# Patient Record
Sex: MALE | Race: BLACK OR AFRICAN AMERICAN | NOT HISPANIC OR LATINO
[De-identification: names, ages, dates, MRNs, and addresses within clinical notes are randomized per-mention and may not be internally consistent; named-entity substitution may affect disease eponyms.]

---

## 2019-01-30 PROBLEM — Z00.00 ENCOUNTER FOR PREVENTIVE HEALTH EXAMINATION: Status: ACTIVE | Noted: 2019-01-30

## 2019-02-14 ENCOUNTER — APPOINTMENT (OUTPATIENT)
Dept: POPULATION HEALTH | Facility: CLINIC | Age: 64
End: 2019-02-14
Payer: COMMERCIAL

## 2019-02-14 VITALS
DIASTOLIC BLOOD PRESSURE: 106 MMHG | TEMPERATURE: 98.5 F | RESPIRATION RATE: 18 BRPM | WEIGHT: 294 LBS | HEART RATE: 109 BPM | HEIGHT: 69 IN | SYSTOLIC BLOOD PRESSURE: 166 MMHG | BODY MASS INDEX: 43.55 KG/M2 | OXYGEN SATURATION: 97 %

## 2019-02-14 DIAGNOSIS — Z87.39 PERSONAL HISTORY OF OTHER DISEASES OF THE MUSCULOSKELETAL SYSTEM AND CONNECTIVE TISSUE: ICD-10-CM

## 2019-02-14 DIAGNOSIS — Z86.79 PERSONAL HISTORY OF OTHER DISEASES OF THE CIRCULATORY SYSTEM: ICD-10-CM

## 2019-02-14 DIAGNOSIS — Z28.21 IMMUNIZATION NOT CARRIED OUT BECAUSE OF PATIENT REFUSAL: ICD-10-CM

## 2019-02-14 DIAGNOSIS — Z80.1 FAMILY HISTORY OF MALIGNANT NEOPLASM OF TRACHEA, BRONCHUS AND LUNG: ICD-10-CM

## 2019-02-14 DIAGNOSIS — E11.9 TYPE 2 DIABETES MELLITUS W/OUT COMPLICATIONS: ICD-10-CM

## 2019-02-14 DIAGNOSIS — Z80.42 FAMILY HISTORY OF MALIGNANT NEOPLASM OF PROSTATE: ICD-10-CM

## 2019-02-14 PROCEDURE — 99205 OFFICE O/P NEW HI 60 MIN: CPT

## 2019-02-14 RX ORDER — RIVAROXABAN 2.5 MG/1
TABLET, FILM COATED ORAL
Refills: 0 | Status: ACTIVE | COMMUNITY

## 2019-02-14 RX ORDER — CHLORTHALIDONE 25 MG/1
25 TABLET ORAL
Refills: 0 | Status: ACTIVE | COMMUNITY

## 2019-02-14 RX ORDER — ALLOPURINOL 300 MG/1
300 TABLET ORAL
Qty: 90 | Refills: 0 | Status: ACTIVE | COMMUNITY
Start: 2018-11-19

## 2019-02-14 RX ORDER — DULAGLUTIDE 1.5 MG/.5ML
1.5 INJECTION, SOLUTION SUBCUTANEOUS
Qty: 6 | Refills: 0 | Status: ACTIVE | COMMUNITY
Start: 2018-06-27

## 2019-02-14 RX ORDER — AMLODIPINE BESYLATE 10 MG/1
10 TABLET ORAL
Refills: 0 | Status: ACTIVE | COMMUNITY

## 2019-02-14 RX ORDER — FINASTERIDE 5 MG/1
5 TABLET, FILM COATED ORAL
Refills: 0 | Status: ACTIVE | COMMUNITY

## 2019-02-14 RX ORDER — SITAGLIPTIN 100 MG/1
100 TABLET, FILM COATED ORAL
Refills: 0 | Status: ACTIVE | COMMUNITY

## 2019-02-14 RX ORDER — LISINOPRIL 40 MG/1
40 TABLET ORAL
Refills: 0 | Status: ACTIVE | COMMUNITY

## 2019-05-16 ENCOUNTER — APPOINTMENT (OUTPATIENT)
Dept: POPULATION HEALTH | Facility: CLINIC | Age: 64
End: 2019-05-16
Payer: COMMERCIAL

## 2019-05-16 VITALS
RESPIRATION RATE: 18 BRPM | DIASTOLIC BLOOD PRESSURE: 90 MMHG | OXYGEN SATURATION: 98 % | BODY MASS INDEX: 43.99 KG/M2 | WEIGHT: 297 LBS | SYSTOLIC BLOOD PRESSURE: 160 MMHG | HEIGHT: 69 IN | TEMPERATURE: 98.2 F | HEART RATE: 111 BPM

## 2019-05-16 PROCEDURE — 99214 OFFICE O/P EST MOD 30 MIN: CPT

## 2019-05-16 NOTE — HEALTH RISK ASSESSMENT
[Patient declined colonoscopy] : Patient declined colonoscopy [ColonoscopyComments] : Says will schedule via PMD

## 2019-05-16 NOTE — PAST MEDICAL HISTORY
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 \par Around the time of the event, would start driving 2:45 pm until ~ 1am. One thirty minute break that could fall anywhere during the shift.

## 2019-05-16 NOTE — HISTORY OF PRESENT ILLNESS
[FreeTextEntry1] : 64 yo DM2, obesity, never smoker, HTN rtd  here for routine f/u of work-inducted  8/17. Aug 2, 2017 \par In the interim since our last visit no issues of recurrence, calf pain, SOB/INGRAM, CP or bleeding d/o. Is adherent with anti-coag meds. Noting MSK issues in his elbow and knees that he believes are work-related (but no case established)\par \par BP is high today. Says he has not been taking his BP meds consistently. Denies CP, SOB, INGRAM, Dizziness, HA, blurry vision. \par

## 2019-05-16 NOTE — ASSESSMENT
[FreeTextEntry1] : 63 m h/o DM, HTN, obesity here for routine f/u of  work-related DVT/PE. Issue is stable. RF and s/sxs of DVT/PE reviewed. Pt inquired about possible work-related knee pain and elbow pain but discussion/evaluation deferred as these are not related to the work-related DVT. Pt should continue with Xarelto and f/u with his treating MDs, as warranted. No further intervention at this time.  \par \par Regarding the incidental finding of elevated BP, he is asymptomatic and not so elevated as to warrant him going to the ED however we discussed s/sxs of CAD and CVA and advised him to call 911 if he experiences any of these. I also advised him to go to see his PMD ASAP (or to call 911 if symptomatic)\par Extensive discussion about the importance and value of wt loss, diet, exercise. All questions answered. Pt agrees and understands plan. \par \par Will fill out C4

## 2019-05-16 NOTE — PHYSICAL EXAM
[General Appearance - In No Acute Distress] : in no acute distress [General Appearance - Alert] : alert [Extraocular Movements] : extraocular movements were intact [Sclera] : the sclera and conjunctiva were normal [PERRL With Normal Accommodation] : pupils were equal in size, round, and reactive to light [Oropharynx] : the oropharynx was normal [Neck Appearance] : the appearance of the neck was normal [Outer Ear] : the ears and nose were normal in appearance [Thyroid Diffuse Enlargement] : the thyroid was not enlarged [Jugular Venous Distention Increased] : there was no jugular-venous distention [Neck Cervical Mass (___cm)] : no neck mass was observed [Thyroid Nodule] : there were no palpable thyroid nodules [Auscultation Breath Sounds / Voice Sounds] : lungs were clear to auscultation bilaterally [Heart Rate And Rhythm] : heart rate was normal and rhythm regular [Heart Sounds] : normal S1 and S2 [Heart Sounds Gallop] : no gallops [Murmurs] : no murmurs [Full Pulse] : the pedal pulses are present [Heart Sounds Pericardial Friction Rub] : no pericardial rub [Edema] : there was no peripheral edema [Abdomen Soft] : soft [Bowel Sounds] : normal bowel sounds [Cervical Lymph Nodes Enlarged Anterior Bilaterally] : anterior cervical [Cervical Lymph Nodes Enlarged Posterior Bilaterally] : posterior cervical [Supraclavicular Lymph Nodes Enlarged Bilaterally] : supraclavicular [Inguinal Lymph Nodes Enlarged Bilaterally] : inguinal [Femoral Lymph Nodes Enlarged Bilaterally] : femoral [Axillary Lymph Nodes Enlarged Bilaterally] : axillary [] : no rash [Skin Turgor] : normal skin turgor [Skin Color & Pigmentation] : normal skin color and pigmentation [Deep Tendon Reflexes (DTR)] : deep tendon reflexes were 2+ and symmetric [Sensation] : the sensory exam was normal to light touch and pinprick [No Focal Deficits] : no focal deficits [Oriented To Time, Place, And Person] : oriented to person, place, and time [Impaired Insight] : insight and judgment were intact [Affect] : the affect was normal [FreeTextEntry1] : large,

## 2019-08-08 ENCOUNTER — APPOINTMENT (OUTPATIENT)
Dept: POPULATION HEALTH | Facility: CLINIC | Age: 64
End: 2019-08-08
Payer: OTHER MISCELLANEOUS

## 2019-08-08 VITALS
RESPIRATION RATE: 17 BRPM | HEART RATE: 90 BPM | HEIGHT: 68 IN | DIASTOLIC BLOOD PRESSURE: 87 MMHG | WEIGHT: 297 LBS | BODY MASS INDEX: 45.01 KG/M2 | SYSTOLIC BLOOD PRESSURE: 148 MMHG | TEMPERATURE: 97.9 F

## 2019-08-08 PROCEDURE — 99214 OFFICE O/P EST MOD 30 MIN: CPT

## 2019-08-08 NOTE — ASSESSMENT
[FreeTextEntry1] : 63 m h/o DM, HTN, obesity here for routine f/u of  work-related DVT/PE. Issue is stable. RF and s/sxs of DVT/PE reviewed. Pt reporting occasional episodes of SOB and CP (Nonexertional) that are similar to his PE episode. I advised him to f/u with cardiology ASAP to have a full evaluation.  Pt should continue with Xarelto and f/u with his treating MDs, as warranted. Otherwise, no changes to his current treatment plan at this time.  \par \par Advised him to call 911 if he experiences any CP and SOB again. \par Extensive discussion about the importance and value of wt loss, diet, exercise, avoidance of prolonged sitting. All questions answered. Pt agrees and understands plan. \par \par Will fill out C4/2

## 2019-08-08 NOTE — PAST MEDICAL HISTORY
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 \par Around the time of the event, would start driving 2:45 pm until ~ 1am. One thirty minute break that could fall anywhere during the shift.

## 2019-08-08 NOTE — PHYSICAL EXAM
[General Appearance - Alert] : alert [General Appearance - In No Acute Distress] : in no acute distress [PERRL With Normal Accommodation] : pupils were equal in size, round, and reactive to light [Sclera] : the sclera and conjunctiva were normal [Oropharynx] : the oropharynx was normal [Extraocular Movements] : extraocular movements were intact [Outer Ear] : the ears and nose were normal in appearance [Neck Appearance] : the appearance of the neck was normal [Neck Cervical Mass (___cm)] : no neck mass was observed [Jugular Venous Distention Increased] : there was no jugular-venous distention [Thyroid Diffuse Enlargement] : the thyroid was not enlarged [Thyroid Nodule] : there were no palpable thyroid nodules [Auscultation Breath Sounds / Voice Sounds] : lungs were clear to auscultation bilaterally [Heart Rate And Rhythm] : heart rate was normal and rhythm regular [Heart Sounds Gallop] : no gallops [Heart Sounds] : normal S1 and S2 [Heart Sounds Pericardial Friction Rub] : no pericardial rub [Murmurs] : no murmurs [Full Pulse] : the pedal pulses are present [Edema] : there was no peripheral edema [Abdomen Soft] : soft [Bowel Sounds] : normal bowel sounds [Cervical Lymph Nodes Enlarged Posterior Bilaterally] : posterior cervical [Cervical Lymph Nodes Enlarged Anterior Bilaterally] : anterior cervical [Supraclavicular Lymph Nodes Enlarged Bilaterally] : supraclavicular [Axillary Lymph Nodes Enlarged Bilaterally] : axillary [Femoral Lymph Nodes Enlarged Bilaterally] : femoral [Abnormal Walk] : normal gait [Inguinal Lymph Nodes Enlarged Bilaterally] : inguinal [Nail Clubbing] : no clubbing  or cyanosis of the fingernails [Musculoskeletal - Swelling] : no joint swelling seen [Motor Tone] : muscle strength and tone were normal [Skin Color & Pigmentation] : normal skin color and pigmentation [Skin Turgor] : normal skin turgor [] : no rash [Deep Tendon Reflexes (DTR)] : deep tendon reflexes were 2+ and symmetric [No Focal Deficits] : no focal deficits [Sensation] : the sensory exam was normal to light touch and pinprick [Oriented To Time, Place, And Person] : oriented to person, place, and time [Affect] : the affect was normal [Impaired Insight] : insight and judgment were intact [FreeTextEntry1] : Obese

## 2019-08-08 NOTE — HISTORY OF PRESENT ILLNESS
[FreeTextEntry1] : 62 yo DM2, obesity, never smoker, HTN rtd  here for routine f/u of work-inducted  8/17. Aug 2, 2017 \par In the interim since our last visit no issues of recurrence, calf pain, or bleeding d/o. Mentions episodes of non-exertional CP and SOB that he describes as similarly, though less severe, than the PE episode. Not feeling these presently.  Is adherent with anti-coag meds. Noting MSK issues in his elbow and knees that he believes are work-related (but no case established)\par \par  Denies CP, SOB, INGRAM, Dizziness, HA, blurry vision, n/v, racing heart.\par

## 2019-11-14 ENCOUNTER — APPOINTMENT (OUTPATIENT)
Dept: POPULATION HEALTH | Facility: CLINIC | Age: 64
End: 2019-11-14
Payer: OTHER MISCELLANEOUS

## 2019-11-14 VITALS
TEMPERATURE: 97.5 F | WEIGHT: 290 LBS | DIASTOLIC BLOOD PRESSURE: 91 MMHG | HEIGHT: 68 IN | SYSTOLIC BLOOD PRESSURE: 163 MMHG | HEART RATE: 90 BPM | OXYGEN SATURATION: 99 % | BODY MASS INDEX: 43.95 KG/M2 | RESPIRATION RATE: 15 BRPM

## 2019-11-14 PROCEDURE — 99214 OFFICE O/P EST MOD 30 MIN: CPT

## 2019-11-14 RX ORDER — ALLOPURINOL 100 MG/1
100 TABLET ORAL
Refills: 0 | Status: DISCONTINUED | COMMUNITY
End: 2019-11-14

## 2019-11-14 RX ORDER — AMLODIPINE BESYLATE 5 MG/1
5 TABLET ORAL
Refills: 0 | Status: DISCONTINUED | COMMUNITY
End: 2019-11-14

## 2019-11-14 RX ORDER — TAMSULOSIN HYDROCHLORIDE 0.4 MG/1
0.4 CAPSULE ORAL
Qty: 30 | Refills: 0 | Status: DISCONTINUED | COMMUNITY
Start: 2018-01-29 | End: 2019-11-14

## 2020-02-10 ENCOUNTER — APPOINTMENT (OUTPATIENT)
Dept: POPULATION HEALTH | Facility: CLINIC | Age: 65
End: 2020-02-10
Payer: OTHER MISCELLANEOUS

## 2020-02-10 VITALS
WEIGHT: 273 LBS | RESPIRATION RATE: 14 BRPM | BODY MASS INDEX: 41.37 KG/M2 | TEMPERATURE: 98.4 F | SYSTOLIC BLOOD PRESSURE: 150 MMHG | HEIGHT: 68 IN | HEART RATE: 84 BPM | DIASTOLIC BLOOD PRESSURE: 91 MMHG | OXYGEN SATURATION: 95 %

## 2020-02-10 DIAGNOSIS — Z28.21 IMMUNIZATION NOT CARRIED OUT BECAUSE OF PATIENT REFUSAL: ICD-10-CM

## 2020-02-10 PROCEDURE — 99214 OFFICE O/P EST MOD 30 MIN: CPT

## 2020-02-10 NOTE — PAST MEDICAL HISTORY
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 \par Around the time of the event, would start driving 2:45 pm until ~ 1am. One thirty minute break that could fall anywhere during the shift.

## 2020-02-10 NOTE — ASSESSMENT
[FreeTextEntry1] : 64 m h/o DM, HTN, obesity here for routine f/u of  work-related DVT/PE. Issue is stable. RF and s/sxs of DVT/PE reviewed with him. Pt reporting occasional episodes of SOB similar to his PE episode. I, again,  advised him to f/u with cardiology ASAP to have a full evaluation.  Pt should continue with Xarelto and f/u with his treating MDs, as warranted. Otherwise, no changes to his current treatment plan at this time.  \par \par Advised him to call 911 if he experiences any CP and SOB again. \par Supported his success with and reinforced the importance and value of wt loss, diet, exercise, avoidance of prolonged sitting. All questions answered. Pt agrees and understands plan.\par Declined flu shot despite counseling\par Advised ortho eval for joint pain. \par Will fill out C4/2\par RTC 3 m WDL

## 2020-02-10 NOTE — HISTORY OF PRESENT ILLNESS
[FreeTextEntry1] : 65 yo DM2, obesity, never smoker, HTN rtd  here for routine f/u of work-related DVT and PE  Aug 2, 2017 \par In the interim since our last visit no issues of recurrence, calf pain, or bleeding d/o. Continues to have episodes of INGRAM and  SOB that he describes as similarly, though less severe, than the PE episode. Not feeling these presently.  Is adherent with anti-coag meds. Noting MSK issues in his elbow and knees that he believes are work-related (but not case established). Says he has been continuing to improve his diet and more physically active but is limited both by INGRAM and by joint pain--nonetheless he has loss weight. \par \par  Denies CP, SOB, INGRAM, Dizziness, HA, pleuritic pain, blurry vision, n/v, racing heart, leg swelling, calf tenderness. \par

## 2020-02-10 NOTE — PHYSICAL EXAM
[General Appearance - In No Acute Distress] : in no acute distress [General Appearance - Alert] : alert [Sclera] : the sclera and conjunctiva were normal [PERRL With Normal Accommodation] : pupils were equal in size, round, and reactive to light [Extraocular Movements] : extraocular movements were intact [Outer Ear] : the ears and nose were normal in appearance [Oropharynx] : the oropharynx was normal [Neck Appearance] : the appearance of the neck was normal [Jugular Venous Distention Increased] : there was no jugular-venous distention [Thyroid Diffuse Enlargement] : the thyroid was not enlarged [Neck Cervical Mass (___cm)] : no neck mass was observed [Thyroid Nodule] : there were no palpable thyroid nodules [Auscultation Breath Sounds / Voice Sounds] : lungs were clear to auscultation bilaterally [Heart Rate And Rhythm] : heart rate was normal and rhythm regular [Heart Sounds] : normal S1 and S2 [Murmurs] : no murmurs [Heart Sounds Gallop] : no gallops [Full Pulse] : the pedal pulses are present [Heart Sounds Pericardial Friction Rub] : no pericardial rub [Bowel Sounds] : normal bowel sounds [Edema] : there was no peripheral edema [Abdomen Soft] : soft [Cervical Lymph Nodes Enlarged Posterior Bilaterally] : posterior cervical [Cervical Lymph Nodes Enlarged Anterior Bilaterally] : anterior cervical [Axillary Lymph Nodes Enlarged Bilaterally] : axillary [Supraclavicular Lymph Nodes Enlarged Bilaterally] : supraclavicular [Femoral Lymph Nodes Enlarged Bilaterally] : femoral [Nail Clubbing] : no clubbing  or cyanosis of the fingernails [Inguinal Lymph Nodes Enlarged Bilaterally] : inguinal [Abnormal Walk] : normal gait [Musculoskeletal - Swelling] : no joint swelling seen [Motor Tone] : muscle strength and tone were normal [Skin Color & Pigmentation] : normal skin color and pigmentation [Skin Turgor] : normal skin turgor [] : no rash [Deep Tendon Reflexes (DTR)] : deep tendon reflexes were 2+ and symmetric [Sensation] : the sensory exam was normal to light touch and pinprick [No Focal Deficits] : no focal deficits [Impaired Insight] : insight and judgment were intact [Affect] : the affect was normal [Oriented To Time, Place, And Person] : oriented to person, place, and time [FreeTextEntry1] : large,

## 2020-05-21 ENCOUNTER — APPOINTMENT (OUTPATIENT)
Dept: POPULATION HEALTH | Facility: CLINIC | Age: 65
End: 2020-05-21
Payer: OTHER MISCELLANEOUS

## 2020-05-21 PROCEDURE — 99203 OFFICE O/P NEW LOW 30 MIN: CPT | Mod: 95

## 2020-05-21 NOTE — PHYSICAL EXAM
[Neck Cervical Mass (___cm)] : no neck mass was observed [Thyroid Diffuse Enlargement] : the thyroid was not enlarged [Thyroid Nodule] : there were no palpable thyroid nodules [Edema] : there was no peripheral edema [FreeTextEntry1] : Deferred

## 2020-05-21 NOTE — ASSESSMENT
[FreeTextEntry1] : 64 m h/o DM, HTN, obesity  for routine f/u via telehealth regarding  work-related DVT/PE. Issue is stable. RF and s/sxs of DVT/PE reviewed with him. Pt reporting occasional episodes of SOB similar to his PE episodes which are likely unrelated to emoblism risk. He is fully compliant with meds and has good f/u care. I, again,  advised him to f/u with cardiology ASAP to have a full evaluation but this is difficult owing to pandemic .  Pt should continue with Xarelto and f/u with his treating MDs, as warranted. Otherwise, no changes to his current treatment plan at this time.  \par I encouraged him to have his MSK issues addressed ASAP but again doing so is made complicated by limited access to providers during the pandemic.\par We reviewed COVID sxs and strategies for reducing risk of exposure. \par \par Advised him to call 911 if he experiences any CP and SOB again. \par Supported his success with and reinforced the importance and value of wt loss, diet, exercise, avoidance of prolonged sitting. All questions answered. Pt agrees and understands plan.\par  \par Will fill out C4/2\par RTC v Telehealth 3 m

## 2020-05-21 NOTE — PAST MEDICAL HISTORY
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 \par Around the time of the event, would start driving 2:45 pm until ~ 1am. One thirty minute break that could fall anywhere during the shift.

## 2020-05-21 NOTE — HISTORY OF PRESENT ILLNESS
[Medical Office: ([facility])___] : at the medical office located in  [Home] : at home, [unfilled] , at the time of the visit. [Verbal consent obtained from patient] : the patient, [unfilled] [FreeTextEntry1] : 65 yo DM2, obesity, never smoker, HTN rtd  telehealth visit for routine f/u of work-related DVT and PE  Aug 2, 2017 \par In the interim since our last visit no issues of recurrence, calf pain, or bleeding d/o. Continues to have episodes of INGRAM and  SOB that he describes as similarly, though less severe than the PE episode. Not feeling these presently.  Is adherent with anti-coag meds. Noting severe MSK pain in feet and knees and ankles bilaterally for which he went to ER due to that he believes are work-related (but not case established). Says he has been continuing to improve his diet and more physically active but is limited both by INGRAM and by joint pain. Says he is dieting to lose weight and having some success. Worried about COVID-19 exposures\par \par  Denies CP, SOB, INGRAM, Dizziness, HA, pleuritic pain, blurry vision, n/v, racing heart, leg swelling, calf tenderness. \par

## 2021-01-07 ENCOUNTER — APPOINTMENT (OUTPATIENT)
Dept: POPULATION HEALTH | Facility: CLINIC | Age: 66
End: 2021-01-07